# Patient Record
Sex: MALE | Race: WHITE | URBAN - METROPOLITAN AREA
[De-identification: names, ages, dates, MRNs, and addresses within clinical notes are randomized per-mention and may not be internally consistent; named-entity substitution may affect disease eponyms.]

---

## 2021-03-12 ENCOUNTER — APPOINTMENT (RX ONLY)
Dept: URBAN - METROPOLITAN AREA OTHER 10 | Facility: OTHER | Age: 76
Setting detail: DERMATOLOGY
End: 2021-03-12

## 2021-03-12 DIAGNOSIS — L0292 CARBUNCLE AND FURUNCLE OF UNSPECIFIED SITE: ICD-10-CM | Status: WORSENING

## 2021-03-12 DIAGNOSIS — L0293 CARBUNCLE AND FURUNCLE OF UNSPECIFIED SITE: ICD-10-CM | Status: WORSENING

## 2021-03-12 PROBLEM — L02.232 CARBUNCLE OF BACK [ANY PART, EXCEPT BUTTOCK]: Status: ACTIVE | Noted: 2021-03-12

## 2021-03-12 PROCEDURE — 10060 I&D ABSCESS SIMPLE/SINGLE: CPT

## 2021-03-12 PROCEDURE — ? COUNSELING

## 2021-03-12 PROCEDURE — ? INCISION AND DRAINAGE

## 2021-03-12 PROCEDURE — ? PRESCRIPTION

## 2021-03-12 PROCEDURE — ? TREATMENT REGIMEN

## 2021-03-12 RX ORDER — DOXYCYCLINE HYCLATE 100 MG/1
CAPSULE, GELATIN COATED ORAL
Qty: 28 | Refills: 0 | Status: ERX | COMMUNITY
Start: 2021-03-12

## 2021-03-12 RX ADMIN — DOXYCYCLINE HYCLATE: 100 CAPSULE, GELATIN COATED ORAL at 00:00

## 2021-03-12 ASSESSMENT — LOCATION DETAILED DESCRIPTION DERM: LOCATION DETAILED: LEFT SUPERIOR MEDIAL MIDBACK

## 2021-03-12 ASSESSMENT — LOCATION ZONE DERM: LOCATION ZONE: TRUNK

## 2021-03-12 ASSESSMENT — LOCATION SIMPLE DESCRIPTION DERM: LOCATION SIMPLE: LEFT LOWER BACK

## 2021-03-22 ENCOUNTER — APPOINTMENT (RX ONLY)
Dept: URBAN - METROPOLITAN AREA OTHER 10 | Facility: OTHER | Age: 76
Setting detail: DERMATOLOGY
End: 2021-03-22

## 2021-03-22 DIAGNOSIS — Z48.817 ENCOUNTER FOR SURGICAL AFTERCARE FOLLOWING SURGERY ON THE SKIN AND SUBCUTANEOUS TISSUE: ICD-10-CM

## 2021-03-22 PROCEDURE — 99024 POSTOP FOLLOW-UP VISIT: CPT

## 2021-03-22 PROCEDURE — ? PRESCRIPTION

## 2021-03-22 PROCEDURE — ? POST-OP WOUND CHECK

## 2021-03-22 RX ORDER — MUPIROCIN 20 MG/G
OINTMENT TOPICAL BID
Qty: 15 | Refills: 0 | Status: ERX | COMMUNITY
Start: 2021-03-22

## 2021-03-22 RX ADMIN — MUPIROCIN: 20 OINTMENT TOPICAL at 00:00

## 2021-03-22 ASSESSMENT — LOCATION ZONE DERM: LOCATION ZONE: TRUNK

## 2021-03-22 ASSESSMENT — LOCATION SIMPLE DESCRIPTION DERM: LOCATION SIMPLE: LEFT UPPER BACK

## 2021-03-22 ASSESSMENT — LOCATION DETAILED DESCRIPTION DERM: LOCATION DETAILED: LEFT INFERIOR UPPER BACK

## 2021-03-22 NOTE — PROCEDURE: POST-OP WOUND CHECK
Detail Level: Detailed
Add 69846 Cpt? (Important Note: In 2017 The Use Of 22601 Is Being Tracked By Cms To Determine Future Global Period Reimbursement For Global Periods): yes